# Patient Record
Sex: FEMALE | Race: ASIAN | NOT HISPANIC OR LATINO
[De-identification: names, ages, dates, MRNs, and addresses within clinical notes are randomized per-mention and may not be internally consistent; named-entity substitution may affect disease eponyms.]

---

## 2020-07-23 ENCOUNTER — APPOINTMENT (OUTPATIENT)
Dept: OPHTHALMOLOGY | Facility: CLINIC | Age: 55
End: 2020-07-23

## 2020-07-27 PROBLEM — Z00.00 ENCOUNTER FOR PREVENTIVE HEALTH EXAMINATION: Status: ACTIVE | Noted: 2020-07-27

## 2020-08-11 ENCOUNTER — APPOINTMENT (OUTPATIENT)
Dept: OPHTHALMOLOGY | Facility: CLINIC | Age: 55
End: 2020-08-11

## 2021-06-17 ENCOUNTER — EMERGENCY (EMERGENCY)
Facility: HOSPITAL | Age: 56
LOS: 1 days | Discharge: HOME | End: 2021-06-17
Attending: PERSONAL EMERGENCY RESPONSE ATTENDANT | Admitting: PERSONAL EMERGENCY RESPONSE ATTENDANT
Payer: COMMERCIAL

## 2021-06-17 VITALS
TEMPERATURE: 98 F | HEART RATE: 83 BPM | SYSTOLIC BLOOD PRESSURE: 156 MMHG | RESPIRATION RATE: 17 BRPM | OXYGEN SATURATION: 100 % | DIASTOLIC BLOOD PRESSURE: 89 MMHG

## 2021-06-17 PROCEDURE — 99283 EMERGENCY DEPT VISIT LOW MDM: CPT

## 2021-06-17 NOTE — ED PROVIDER NOTE - PATIENT PORTAL LINK FT
You can access the FollowMyHealth Patient Portal offered by [facility] by registering at the following website: [email or web address]. By joining [employer]’s FollowMyHealth portal, you will also be able to view your health information using other applications (apps) compatible with our system.

## 2021-06-17 NOTE — ED PROVIDER NOTE - PROGRESS NOTE DETAILS
Alan Martinez DO PGY-2: tolerated po. no new complains. Attending MD Ramirez.  Pt's oropharyngeal exam is unremarkable.  She continues to manage secretions and breath easily without difficulty.  Pt tolerating po without difficulty.  She verablizes understanding that any difficulty breathing, swallowing, development of sore throat warrants return to ED.

## 2021-06-17 NOTE — ED PROVIDER NOTE - NS ED ROS FT
Gen: Denies fever, chills  CV: Denies chest pain  Skin: Denies rash, erythema  Resp: Denies SOB, cough  ENT: Denies nasal congestion, drooling  GI: Denies nausea, vomiting, diarrhea  Msk: Denies LE swelling  : Denies dysuria  Neuro: Denies headache

## 2021-06-17 NOTE — ED PROVIDER NOTE - OBJECTIVE STATEMENT
57 yo F presents with accidental ingestion of small amt of house bleach 2 hrs ago. States she put bleach in drinking water bottle to take to laundromat. Left the bottle on the table and drank from it. Realized it was not water right away and spitted it out. Swish and spit with tap water right away. No chest pain, shortness of breath, stridor, cough, f/c. NO difficulty swallowing, drooling.  Denies any suicidal ideation/attempts. Live with family.

## 2021-06-17 NOTE — ED PROVIDER NOTE - ATTENDING CONTRIBUTION TO CARE
Attending MD Ramirez.  Agree with above.  Pt is an otherwise healthy 57 yo female who presents to ED after she poured bleach in a water bottle to take it to the [employer], got there and took a sip, realized it wasn't water and spit it out right away and rinsed mouth ~2 hours ago.  No drooling, difficulty breathing, no external or intraoral injuries. No SI/HI.  Pt educated that should she develop drooling, pain, difficulty swallowing, breathing or new concerns she should return immediately.

## 2021-06-17 NOTE — ED PROVIDER NOTE - PHYSICAL EXAMINATION
Gen: non toxic appearing, NAD   Head: NC/NT  Eyes: anicteric  ENT: airway patent, mmm, oral cavity and pharynx normal. No inflammation, swelling, exudate, or lesions.   CV: RRR, +S1/S2  Resp: CTAB, symmetric breath sounds  GI: abdomen soft non-distended, NTTP  Extremities -  no edema  Neuro: A&Ox4, following commands, speech clear, moving all four extremities spontaneously   Psych: appropriate mood, normal insight
